# Patient Record
Sex: FEMALE
[De-identification: names, ages, dates, MRNs, and addresses within clinical notes are randomized per-mention and may not be internally consistent; named-entity substitution may affect disease eponyms.]

---

## 2021-07-09 ENCOUNTER — APPOINTMENT (OUTPATIENT)
Dept: OTOLARYNGOLOGY | Facility: CLINIC | Age: 51
End: 2021-07-09
Payer: COMMERCIAL

## 2021-07-09 DIAGNOSIS — Z87.891 PERSONAL HISTORY OF NICOTINE DEPENDENCE: ICD-10-CM

## 2021-07-09 DIAGNOSIS — H93.299 OTHER ABNORMAL AUDITORY PERCEPTIONS, UNSPECIFIED EAR: ICD-10-CM

## 2021-07-09 DIAGNOSIS — Z86.39 PERSONAL HISTORY OF OTHER ENDOCRINE, NUTRITIONAL AND METABOLIC DISEASE: ICD-10-CM

## 2021-07-09 DIAGNOSIS — H92.02 OTALGIA, LEFT EAR: ICD-10-CM

## 2021-07-09 PROBLEM — Z00.00 ENCOUNTER FOR PREVENTIVE HEALTH EXAMINATION: Status: ACTIVE | Noted: 2021-07-09

## 2021-07-09 PROCEDURE — 99204 OFFICE O/P NEW MOD 45 MIN: CPT | Mod: 25

## 2021-07-09 PROCEDURE — 92557 COMPREHENSIVE HEARING TEST: CPT

## 2021-07-09 PROCEDURE — 31575 DIAGNOSTIC LARYNGOSCOPY: CPT

## 2021-07-09 PROCEDURE — 92550 TYMPANOMETRY & REFLEX THRESH: CPT

## 2021-07-09 RX ORDER — LEVOTHYROXINE SODIUM 0.17 MG/1
TABLET ORAL
Refills: 0 | Status: ACTIVE | COMMUNITY

## 2021-07-09 RX ORDER — PREDNISONE 10 MG/1
10 TABLET ORAL
Qty: 12 | Refills: 0 | Status: ACTIVE | COMMUNITY
Start: 2021-07-09 | End: 1900-01-01

## 2021-07-09 NOTE — HISTORY OF PRESENT ILLNESS
[de-identified] : ROSA ISELA LR is a 50 year patient With a two-month history of left ear fullness/abnormal auditory perception, hearing loss, and tinnitus. She has no dizziness or otorrhea. She also has intermittent mild left otalgia which has been present for longer.  She had the second COVID vaccine in May. Her symptoms started around then. She also had headaches at the time as well. She does have a history of migraines. She denies a history of TMJ dysfunction. A few years ago, she said that she saw an ENT and had a hearing test. Her hearing was borderline in one ear. She has no history of recurrent ear infections or prior otologic surgery. She has had concussions in the past.  She does have history of noise exposure. She does not smoke. She has no throat symptoms. She does suffer from seasonal allergies. However, she has not had nasal congestion since the spring.

## 2021-07-09 NOTE — CONSULT LETTER
[Dear  ___] : Dear  [unfilled], [Consult Letter:] : I had the pleasure of evaluating your patient, [unfilled]. [Please see my note below.] : Please see my note below. [Consult Closing:] : Thank you very much for allowing me to participate in the care of this patient.  If you have any questions, please do not hesitate to contact me. [Sincerely,] : Sincerely, [FreeTextEntry3] : Deena Rodriguez MD\par

## 2021-07-09 NOTE — ASSESSMENT
[FreeTextEntry1] : She has a two-month history of left ear fullness, tinnitus, and hearing loss. We discussed the possibility of a sudden sensorineural hearing loss. However, she may have had borderline hearing loss in one ear a few years ago. It is difficult to know if this is a sudden hearing loss. The ears were normal on exam. She also has migraines and may have TMJ dysfunction which can cause some of the ear symptoms including the discomfort.  She has seasonal allergies. Nasal endoscopy and flexible laryngoscopy were unremarkable.\par \par PLAN\par \par -findings and management options discussed with the patient. \par - Good aural hygiene.\par - wax removal drops as needed\par - noise precautions\par - monitor hearing. \par - If this is a sudden sensorineural hearing loss, she could try a course of oral steroids. Since it occurred 2 months ago, it is unclear how helpful it would be at this time. It is a mild asymmetry in the left ear. She may try short burst to see if it helps.I reviewed some of the associated risks including the risk of mood changes, GI complications and bone issues/fractures. I also asked the patient to review list of risks and side effects of the medication given by the pharmacy.  She should take the medication with food if she opts to try it and consider taking pepcid. \par - Nasal steroid spray and/or antihistamine if she has nasal congestion and eustachian tube dysfunction\par - I spoke with her about further workup to rule out retrocochlear lesion. She may consider observation with serial audiograms, ABR, or MRI. She is going to think it over but is leaning towards observation\par - Treatment for migraines recommended\par - Dental evaluation to rule out TMJ dysfunction recommended. She was given literature\par -I would like to see her back in 2 weeks if she takes the oral steroids and feels that her hearing improves. If she notices no improvement or decides not to take them, I will see her back in 3 months.\par - call and return earlier if any concerns\par

## 2021-07-15 ENCOUNTER — APPOINTMENT (OUTPATIENT)
Dept: OTOLARYNGOLOGY | Facility: CLINIC | Age: 51
End: 2021-07-15
Payer: COMMERCIAL

## 2021-07-15 DIAGNOSIS — H90.5 UNSPECIFIED SENSORINEURAL HEARING LOSS: ICD-10-CM

## 2021-07-15 DIAGNOSIS — H93.292 OTHER ABNORMAL AUDITORY PERCEPTIONS, LEFT EAR: ICD-10-CM

## 2021-07-15 DIAGNOSIS — H92.09 OTALGIA, UNSPECIFIED EAR: ICD-10-CM

## 2021-07-15 PROCEDURE — 99442: CPT | Mod: 95

## 2021-07-15 NOTE — HISTORY OF PRESENT ILLNESS
[de-identified] : Telemedicine phone call was performed for follow up. There are limitations of telemedicine encounters including the risks associated with the technology platform and lack of a physical exam.  The patient may need further testing and work up to arrive at a diagnosis and treatment plan.  The patient was made aware when the appointment was scheduled that this will be billed as a visit.  The patient understood and elected to proceed.\par \par ROSA ISELA LR is a 50 year patient With a history of left abnormal auditory perception and hearing loss.  I saw her on July 9 for a two-month history of left ear fullness and difficulty hearing. On exam, her ears were normal. Audiogram showed a mild asymmetric left high-frequency sensorineural hearing loss. She also has mild otalgia. We had discussed the possibility a sudden sensorineural hearing loss. She tried a course of oral steroids but did not notice improvement. We discussed other possible etiologies for the ear fullness besides hearing loss such as eustachian tube dysfunction, atypical migraines, TMJ dysfunction, and lesions. She would like to proceed with the MRI to rule out retrocochlear pathology.

## 2021-07-15 NOTE — ASSESSMENT
[FreeTextEntry1] : She continues to have left ear fullness, mild otalgia and hearing loss.  She did not notice improvement with oral steroids. We discussed possible etiologies\par \par PLAN\par \par -findings and management options discussed with the patient. \par - Good aural hygiene.\par - wax removal drops as needed\par - noise precautions\par - monitor hearing. \par - Nasal steroid spray and/or antihistamine if she has nasal congestion and eustachian tube dysfunction\par - She'll be sent for an MRI with contrast of the IACs to rule out retrocochlear pathology\par - We also discussed further inner ear testing- CAP, extended audiogram to cover more high frequencies. \par - Treatment for migraines recommended\par - Dental evaluation to rule out TMJ dysfunction recommended. \par - follow up or call for the MRI results. \par - call and return earlier if any concerns\par .

## 2022-05-06 ENCOUNTER — NON-APPOINTMENT (OUTPATIENT)
Age: 52
End: 2022-05-06

## 2025-07-13 ENCOUNTER — NON-APPOINTMENT (OUTPATIENT)
Age: 55
End: 2025-07-13